# Patient Record
Sex: MALE | Race: OTHER | HISPANIC OR LATINO | Employment: OTHER | URBAN - METROPOLITAN AREA
[De-identification: names, ages, dates, MRNs, and addresses within clinical notes are randomized per-mention and may not be internally consistent; named-entity substitution may affect disease eponyms.]

---

## 2018-01-15 NOTE — PROCEDURES
Procedures by Alex Perales MD at  5/2/2016  9:08 AM      Author:  Alex Perales MD Service:  Cardiology Author Type:  Physician     Filed:  5/2/2016  9:12 AM Date of Service:  5/2/2016  9:08 AM Status:  Signed     :  Alex Perales MD (Physician)         Pre-procedure Diagnoses:       1  Non-ST elevation MI (NSTEMI) [I21 4]                Post-procedure Diagnoses:       1  CAD (coronary artery disease) [I25 10]                Procedures:       1  CARDIAC CATHETERIZATION [CATH01 (Custom)]                     Cardiac Catheterization Operative Report    Primary care doctor: Dr Álvaro Cárdenas    Cardiologist : Dr Merlin Castro    Brief history:  74-year-old male with history of persistent atrial fibrillation, acute gout, was admitted with shortness of breath and has positive troponin and abnormal  EKG  In view of that he was scheduled for cardiac catheterization  Procedure Details  The risks, benefits, complications, including risk of stroke, heart attack, bleeding and even death but not limited to at along with treatment options, and expected outcomes were discussed with the patient  The patient and/or family concurred with the  proposed plan, giving informed consent  Patient was brought to the cath lab after IV hydration was begun and oral premedication was given  He was further sedated with midazolam and fentanyl  He was prepped and draped in the usual manner  Using the modified  Seldinger access technique, a 5 Chinese sheath was placed in the right radial artery without any complications  Patient was given intra-arterial nitroglycerin and IV verapamil  Heparin was administered  A left heart catheterization was done  Angiograms  were also done  End of the procedure patient was transferred to holding area without any complications  He tolerated the procedure well      After the procedure was completed, sedation was stopped and the sheaths and catheters were all removed   Hemostasis was achieved with vasc band     Equipment used:     1  JR4 for right coronary angiography  2  Tig catheter for left main  3  LV gram  with TIG    Findings:  1  Dominance: Right dominant coronary system    2  Left main: Left main is a large vessel  It is about 5-6 mm It bifurcates into large LAD and a nondominant circumflex but large circumflex system     3  LAD: LAD is a large-size vessel and it is around 5 mm vessels and it gives a diagonal mild luminal regularities noted no focal stenosis seen  Slightly slow flow may noted due to large diameter  4  Circumflex: Medium to large-size vessel it has mild luminal regularities at a bend no focal stenosis seen    5  RCA: Large side vessel mild luminal regularities noted no focal stenosis seen    6  LV gram: LV gram done in HARDY view  Normal LV systolic function LVED V was around 14-15 mmHg and there was no gradient across aortic valve  His ejection fraction is around 55-60%    Estimated Blood Loss:  Minimal         Complications:  None; patient tolerated the procedure well  Recommendation: Continue medical therapy  Continue risk factor modification and work up for noncardiac chest pain is recommended  Patient probably have type II non-ST elevation MI           Disposition: Hemodynamically stable and PACU - hemodynamically stable           Condition: stable    Portions of the record may have been created with voice recognition software   Occasional wrong word or sound a like substitutions may have occurred due to  the inherent limitations of voice recognition software   Read the chart carefully and recognize, using context, where substitutions have occurred             Received for:Provider  EPIC   May  2 2016  1:58PM [place] Standard Time

## 2022-04-07 ENCOUNTER — TELEPHONE (OUTPATIENT)
Dept: FAMILY MEDICINE CLINIC | Facility: CLINIC | Age: 67
End: 2022-04-07

## 2022-04-07 NOTE — TELEPHONE ENCOUNTER
Patient Attribution- Called to schedule an appt  Patient has not been seen in office since 2015  Ph# disconnected  Called emerg contact, sister Suha Strickland, to obtain better contact ph#- Spoke with sister who said patient moved to [facility] about 3 years ago  Prior to that he moved to [place]  [place]- please remove Dr Edi Escudero as PCP

## 2022-06-19 NOTE — TELEPHONE ENCOUNTER
06/18/22 11:13 PM     Thank you for your request  Your request has been received, reviewed, and the patient chart updated  The PCP has successfully been removed with a patient attribution note  This message will now be completed      Thank you  Mahesh Arriola